# Patient Record
Sex: MALE | Race: WHITE | NOT HISPANIC OR LATINO
[De-identification: names, ages, dates, MRNs, and addresses within clinical notes are randomized per-mention and may not be internally consistent; named-entity substitution may affect disease eponyms.]

---

## 2022-06-20 ENCOUNTER — APPOINTMENT (OUTPATIENT)
Dept: ORTHOPEDIC SURGERY | Facility: CLINIC | Age: 67
End: 2022-06-20
Payer: MEDICARE

## 2022-06-20 PROCEDURE — J3490M: CUSTOM

## 2022-06-20 PROCEDURE — 99213 OFFICE O/P EST LOW 20 MIN: CPT | Mod: 25

## 2022-06-20 PROCEDURE — 20604 DRAIN/INJ JOINT/BURSA W/US: CPT

## 2022-06-20 NOTE — PROCEDURE
[FreeTextEntry3] : Patient has tried OTC's including aspirin, Ibuprofen, Aleve, etc or prescription NSAIDS, and/or exercises at home and/or physical therapy without satisfactory response and the risks benefits, and alternatives have been discussed, and verbal consent was obtained.\par \par The risks, benefits and contents of the injection have been discussed.  Risks include but are not limited to allergic reaction, flare reaction, permanent white skin discoloration at the injection site and infection.  The patient understands the risks and agrees to having the injection.  All questions have been answered.\par \par An injection of the left 4th tarsometatarsal joint was performed. The indication for this procedure was pain and inflammation. The site was prepped with alcohol and sterile technique used. An injection of 1cc of Lidocaine 1% , 1cc of Bupivacaine (Marcaine) 0.5% ,and 1cc of 80mg Methylprednisolone (Depomedrol) was used. Patient tolerated procedure well. Patient was advised to call if redness, pain, or fever occur, apply ice for 15 minutes out of every hour for the next 12-24 hours as tolerated and patient was advised to rest the joint(s) for 3 days.\par \par Ultrasound guidance was indicated for this patient due to joint space narrowing secondary to arthritis . All ultrasound images have been permanently captured and stored accordingly in our picture archiving and communication system. Visualization of the needle and placement of injection was performed without complication.\par

## 2022-06-20 NOTE — PHYSICAL EXAM
[Left] : left foot and ankle [4th] : 4th [Right] : right foot and ankle [NL (40)] : plantar flexion 40 degrees [NL 30)] : inversion 30 degrees [NL (20)] : eversion 20 degrees [5___] : eversion 5[unfilled]/5 [2+] : posterior tibialis pulse: 2+ [Normal] : saphenous nerve sensation normal [] : non-antalgic [de-identified] : eversion 15 degrees [TWNoteComboBox7] : dorsiflexion 15 degrees

## 2022-06-20 NOTE — ASSESSMENT
[FreeTextEntry1] : Patient has no real findings on the right foot.\par We discussed a steroid injection for the left foot, and he would like to proceed.

## 2022-06-20 NOTE — HISTORY OF PRESENT ILLNESS
[de-identified] : Patient returns for follow up of both feet. H/O LT foot OA which responded well to CSI on 9/1/21. He reports RT foot pain feels similar, started without trauma/injury mid February 2022. but resolved.  He states that both feet (left greater than right) started bothering him again in early June, 2022.  No trauma.  Symptoms are worse with start up.

## 2022-08-10 ENCOUNTER — APPOINTMENT (OUTPATIENT)
Dept: NEUROLOGY | Facility: CLINIC | Age: 67
End: 2022-08-10

## 2022-09-20 ENCOUNTER — APPOINTMENT (OUTPATIENT)
Dept: ORTHOPEDIC SURGERY | Facility: CLINIC | Age: 67
End: 2022-09-20

## 2022-09-20 VITALS — BODY MASS INDEX: 34.36 KG/M2 | HEIGHT: 70 IN | WEIGHT: 240 LBS

## 2022-09-20 DIAGNOSIS — E11.9 TYPE 2 DIABETES MELLITUS W/OUT COMPLICATIONS: ICD-10-CM

## 2022-09-20 DIAGNOSIS — E78.00 PURE HYPERCHOLESTEROLEMIA, UNSPECIFIED: ICD-10-CM

## 2022-09-20 DIAGNOSIS — S92.424A NONDISPLACED FRACTURE OF DISTAL PHALANX OF RIGHT GREAT TOE, INITIAL ENCOUNTER FOR CLOSED FRACTURE: ICD-10-CM

## 2022-09-20 PROCEDURE — 73630 X-RAY EXAM OF FOOT: CPT | Mod: RT

## 2022-09-20 PROCEDURE — 28490 TREAT BIG TOE FRACTURE: CPT

## 2022-09-20 PROCEDURE — 99213 OFFICE O/P EST LOW 20 MIN: CPT | Mod: 25

## 2022-09-20 NOTE — REASON FOR VISIT
[FreeTextEntry2] : RT foot  [facility] Pregnancy And Lactation Text: This medication is Pregnancy Category C and it isn't known if it is safe during pregnancy. It is unknown if it is excreted in breast milk.

## 2022-09-20 NOTE — ASSESSMENT
[FreeTextEntry1] : wbat\par supportive shoe\par ice/elevate\par toe spacer\par nsaids prn\par f/up 1 month if not resolved
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
## 2023-03-13 ENCOUNTER — APPOINTMENT (OUTPATIENT)
Dept: ORTHOPEDIC SURGERY | Facility: CLINIC | Age: 68
End: 2023-03-13
Payer: MEDICARE

## 2023-03-13 DIAGNOSIS — S92.424D NONDISPLACED FRACTURE OF DISTAL PHALANX OF RIGHT GREAT TOE, SUBSEQUENT ENCOUNTER FOR FRACTURE WITH ROUTINE HEALING: ICD-10-CM

## 2023-03-13 PROCEDURE — 73630 X-RAY EXAM OF FOOT: CPT | Mod: 50

## 2023-03-13 PROCEDURE — 99213 OFFICE O/P EST LOW 20 MIN: CPT | Mod: 25

## 2023-03-13 PROCEDURE — 73600 X-RAY EXAM OF ANKLE: CPT | Mod: LT

## 2023-03-13 PROCEDURE — 76942 ECHO GUIDE FOR BIOPSY: CPT | Mod: LT

## 2023-03-13 PROCEDURE — 20604 DRAIN/INJ JOINT/BURSA W/US: CPT | Mod: F3

## 2023-03-13 PROCEDURE — J3490M: CUSTOM

## 2023-03-13 NOTE — PHYSICAL EXAM
[Left] : left foot and ankle [4th] : 4th [Right] : right foot and ankle [NL (40)] : plantar flexion 40 degrees [NL 30)] : inversion 30 degrees [NL (20)] : eversion 20 degrees [2+] : posterior tibialis pulse: 2+ [Normal] : saphenous nerve sensation normal [5___] : [facility] 5[unfilled]/5 [] : non-antalgic [Bilateral] : foot bilaterally [Weight -] : weightbearing [de-identified] : Crepitus in the ankle with passive motion. [FreeTextEntry9] : Mild tibiotalar joint degenerative changes with medial malleolar screw.  [de-identified] : B/L mid foot degenerative changes, well healed 1st distal phalanx on the RT [de-identified] : eversion 15 degrees [TWNoteComboBox7] : dorsiflexion 15 degrees

## 2023-03-13 NOTE — HISTORY OF PRESENT ILLNESS
[de-identified] : Patient returns for follow up of both feet. H/O LT foot OA which responded well to4th [facility]  CSI on 6/20/22. He reports RT foot pain feels similar, started without trauma/injury mid February 2022 but resolved.  He had a right great toe fracture on 9/2022, seen by Dr Sprague. He reports both feet are bothering him.

## 2023-06-09 ENCOUNTER — APPOINTMENT (OUTPATIENT)
Dept: ORTHOPEDIC SURGERY | Facility: CLINIC | Age: 68
End: 2023-06-09
Payer: MEDICARE

## 2023-06-09 PROCEDURE — J3490M: CUSTOM

## 2023-06-09 PROCEDURE — 20604 DRAIN/INJ JOINT/BURSA W/US: CPT | Mod: 50

## 2023-06-09 PROCEDURE — 99213 OFFICE O/P EST LOW 20 MIN: CPT | Mod: 25

## 2023-06-09 NOTE — PHYSICAL EXAM
[Left] : left foot and ankle [Right] : right foot and ankle [NL (40)] : plantar flexion 40 degrees [NL 30)] : inversion 30 degrees [NL (20)] : eversion 20 degrees [5___] : eversion 5[unfilled]/5 [2+] : posterior tibialis pulse: 2+ [Normal] : saphenous nerve sensation normal [Bilateral] : foot bilaterally [Weight -] : weightbearing [FreeTextEntry9] : Mild tibiotalar joint degenerative changes with medial malleolar screw.  [de-identified] : B/L mid foot degenerative changes, well healed 1st distal phalanx on the RT [4th] : 4th [] : no pain when stressing lateral tarsal metatarsal joint [de-identified] : Crepitus in the ankle with passive motion. [de-identified] : eversion 15 degrees [TWNoteComboBox7] : dorsiflexion 15 degrees

## 2023-06-09 NOTE — ASSESSMENT
[FreeTextEntry1] : We discussed a steroid injection for each foot and he would like to proceed.\par \par WB in supportive footwear.\par Ice to affected area.\par NSAIDS prn.

## 2023-06-09 NOTE — PROCEDURE
[FreeTextEntry3] : Patient has tried OTC's including aspirin, Ibuprofen, Aleve, etc or prescription NSAIDS, and/or exercises at home and/or physical therapy without satisfactory response and the risks benefits, and alternatives have been discussed, and verbal consent was obtained.\par \par The risks, benefits and contents of the injection have been discussed.  Risks include but are not limited to allergic reaction, flare reaction, permanent white skin discoloration at the injection site and infection.  The patient understands the risks and agrees to having the injection.  All questions have been answered.\par \par An injection of the 4th TMT joint bilaterally was performed. The indication for this procedure was pain and inflammation. The site was prepped with alcohol and sterile technique used. An injection of 1cc of Lidocaine 1% , 1cc of Ropivacaine  0.5% ,and 1cc of 80mg Methylprednisolone (Depomedrol) was used. Patient tolerated procedure well. Patient was advised to call if redness, pain, or fever occur, apply ice for 15 minutes out of every hour for the next 12-24 hours as tolerated and patient was advised to rest the joint(s) for 3 days.\par \par Ultrasound guidance was indicated for this patient due to erosive arthritis. All ultrasound images have been permanently captured and stored accordingly in our picture archiving and communication system. Visualization of the needle and placement of injection was performed without complication.\par

## 2023-06-09 NOTE — HISTORY OF PRESENT ILLNESS
[5] : 5 [8] : 8 [Shooting] : shooting [de-identified] : Patient returns for follow up of both feet. H/O LT foot OA which responded well to 4th TMT steroid injection on 6/20/22. He reports RT foot pain feels similar, started without trauma/injury mid February 2022 but less severe.  He had a right great toe fracture on 9/2022, seen by Dr Sprague. He reports both feet are bothering him. He was active on a vacation recently. Has tried shoe modifications without relief.  [FreeTextEntry1] : b/l feet

## 2023-12-08 ENCOUNTER — APPOINTMENT (OUTPATIENT)
Dept: ORTHOPEDIC SURGERY | Facility: CLINIC | Age: 68
End: 2023-12-08

## 2024-04-26 ENCOUNTER — APPOINTMENT (OUTPATIENT)
Dept: ORTHOPEDIC SURGERY | Facility: CLINIC | Age: 69
End: 2024-04-26
Payer: MEDICARE

## 2024-04-26 DIAGNOSIS — M19.072 PRIMARY OSTEOARTHRITIS, LEFT ANKLE AND FOOT: ICD-10-CM

## 2024-04-26 DIAGNOSIS — M19.071 PRIMARY OSTEOARTHRITIS, RIGHT ANKLE AND FOOT: ICD-10-CM

## 2024-04-26 PROCEDURE — J3490M: CUSTOM

## 2024-04-26 PROCEDURE — 20604 DRAIN/INJ JOINT/BURSA W/US: CPT | Mod: 50

## 2024-04-26 PROCEDURE — 76942 ECHO GUIDE FOR BIOPSY: CPT | Mod: NC

## 2024-04-26 PROCEDURE — 99214 OFFICE O/P EST MOD 30 MIN: CPT | Mod: 25

## 2024-04-26 NOTE — DISCUSSION/SUMMARY
[de-identified] : We discussed a steroid injection for each foot and he would like to proceed.  WB in supportive footwear. Ice to affected area. NSAIDS prn.

## 2024-04-26 NOTE — HISTORY OF PRESENT ILLNESS
[5] : 5 [8] : 8 [Shooting] : shooting [de-identified] : Patient returns for follow up of both feet. He has known history of midfoot OA.  He had bilateral 4th TMT joint injections on 6/9/23 with good relief.  He takes gabapentin and advil as needed. WB in supportive sneakers, using orthotics. He reports both feet are bothering him more over the past three months.  No trauma. [FreeTextEntry1] : b/l feet

## 2024-04-26 NOTE — PHYSICAL EXAM
[Left] : left foot and ankle [Right] : right foot and ankle [4th] : 4th [NL (40)] : plantar flexion 40 degrees [NL 30)] : inversion 30 degrees [5___] : eversion 5[unfilled]/5 [2+] : posterior tibialis pulse: 2+ [Normal] : saphenous nerve sensation normal [NL (20)] : dorsiflexion 20 degrees [] : no pain when stressing lateral tarsal metatarsal joint [de-identified] : Crepitus in the ankle with passive motion. [de-identified] : Venous stasis changes.  [de-identified] : eversion 15 degrees [TWNoteComboBox7] : dorsiflexion 15 degrees

## 2024-04-26 NOTE — PROCEDURE
[FreeTextEntry3] : Patient has tried OTC's including aspirin, Ibuprofen, Aleve, etc or prescription NSAIDS, and/or exercises at home and/or physical therapy without satisfactory response and the risks benefits, and alternatives have been discussed, and verbal consent was obtained.  The risks, benefits and contents of the injection have been discussed.  Risks include but are not limited to allergic reaction, flare reaction, permanent white skin discoloration at the injection site and infection.  The patient understands the risks and agrees to having the injection.  All questions have been answered.  An injection of the right and left 4th tarsometatarsal joints was performed. The indication for this procedure was pain and inflammation. The site was prepped with alcohol and sterile technique used. An injection of 1cc of Lidocaine 1% , 1cc of Ropivacaine  0.5% ,and 1cc of 80mg Methylprednisolone (Depomedrol) was used. Patient tolerated procedure well. Patient was advised to call if redness, pain, or fever occur, apply ice for 15 minutes out of every hour for the next 12-24 hours as tolerated and patient was advised to rest the joint(s) for 3 days.  Ultrasound guidance was indicated for this patient due to erosive arthritis. All ultrasound images have been permanently captured and stored accordingly in our picture archiving and communication system. Visualization of the needle and placement of injection was performed without complication.

## 2024-06-05 NOTE — PHYSICAL EXAM
[Right] : right foot and ankle [Mild] : mild swelling of toe(s) [1st] : 1st [NL (20)] : dorsiflexion 20 degrees [NL (40)] : plantar flexion 40 degrees [5___] : eversion 5[unfilled]/5 [1+] : dorsalis pedis pulse: 1+ [] : patient ambulates without assistive device BMI: BMI (kg/m2): 23.5 (02-10-24 @ 02:59)  HbA1c: A1C with Estimated Average Glucose Result: 6.1 % (01-14-24 @ 04:30)    Glucose: POCT Blood Glucose.: 57 mg/dL (04-15-24 @ 09:20)    BP: 111/78 (06-04-24 @ 08:24) (111/78 - 111/78)Vital Signs Last 24 Hrs  T(C): --  T(F): --  HR: --  BP: --  BP(mean): --  RR: --  SpO2: --      Lipid Panel: Date/Time: 01-14-24 @ 04:30  Cholesterol, Serum: 130  LDL Cholesterol Calculated: 61  HDL Cholesterol, Serum: 53  Total Cholesterol/HDL Ration Measurement: --  Triglycerides, Serum: 79

## 2024-08-15 ENCOUNTER — APPOINTMENT (OUTPATIENT)
Dept: ORTHOPEDIC SURGERY | Facility: CLINIC | Age: 69
End: 2024-08-15
Payer: MEDICARE

## 2024-08-15 VITALS — HEIGHT: 70.5 IN | BODY MASS INDEX: 33.27 KG/M2 | WEIGHT: 235 LBS

## 2024-08-15 DIAGNOSIS — M24.272 DISORDER OF LIGAMENT, LEFT ANKLE: ICD-10-CM

## 2024-08-15 DIAGNOSIS — M19.072 PRIMARY OSTEOARTHRITIS, LEFT ANKLE AND FOOT: ICD-10-CM

## 2024-08-15 DIAGNOSIS — M19.071 PRIMARY OSTEOARTHRITIS, RIGHT ANKLE AND FOOT: ICD-10-CM

## 2024-08-15 PROCEDURE — 99213 OFFICE O/P EST LOW 20 MIN: CPT | Mod: 25

## 2024-08-15 PROCEDURE — 20604 DRAIN/INJ JOINT/BURSA W/US: CPT | Mod: 50

## 2024-08-15 PROCEDURE — J3490M: CUSTOM | Mod: JZ

## 2024-08-15 NOTE — PROCEDURE
[FreeTextEntry3] : Patient has tried OTC's including aspirin, Ibuprofen, Aleve, etc or prescription NSAIDS, and/or exercises at home and/or physical therapy without satisfactory response. A steroid injection to suppress acute inflammation was discussed. Tthe risks benefits, and alternatives have been discussed, and verbal consent was obtained.   The risks, benefits and contents of the injection have been discussed.  Risks include but are not limited to allergic reaction, flare reaction, permanent white skin discoloration at the injection site and infection.  The patient understands the risks and agrees to having the injection.  All questions have been answered.   An injection of the bilateral 4th TMT joints were performed. The indication for this procedure was pain and inflammation. The site was prepped with alcohol and sterile technique used. An injection of 1cc of Lidocaine 1% , 1cc of Ropivacaine  0.5% ,and 1cc of 80mg Methylprednisolone (Depomedrol) was used. Patient tolerated procedure well. Patient was advised to call if redness, pain, or fever occur, apply ice for 15 minutes out of every hour for the next 12-24 hours as tolerated and patient was advised to rest the joint(s) for 3 days.   Ultrasound guidance was indicated for this patient due to erosive arthritis. All ultrasound images have been permanently captured and stored accordingly in our picture archiving and communication system. Visualization of the needle and placement of injection was performed without complication.

## 2024-08-15 NOTE — DISCUSSION/SUMMARY
[de-identified] : We discussed a steroid injection for each foot and he would like to proceed.  WB in supportive footwear. Ice to affected area. NSAIDS prn.   Lace up ankle brace for the left ankle was recommended, but patient declined.

## 2024-08-15 NOTE — PHYSICAL EXAM
[Left] : left foot and ankle [Right] : right foot and ankle [4th] : 4th [NL (40)] : plantar flexion 40 degrees [NL 30)] : inversion 30 degrees [5___] : eversion 5[unfilled]/5 [2+] : posterior tibialis pulse: 2+ [Normal] : saphenous nerve sensation normal [NL (20)] : dorsiflexion 20 degrees [] : non-antalgic [de-identified] : Crepitus in the ankle with passive motion. [de-identified] : Venous stasis changes.  [de-identified] : eversion 15 degrees [TWNoteComboBox7] : N

## 2024-08-15 NOTE — HISTORY OF PRESENT ILLNESS
[Shooting] : shooting [7] : 7 [5] : 5 [Frequent] : frequent [Leisure] : leisure [de-identified] : Patient returns for follow up of both feet. He has known history of midfoot OA.  He had bilateral 4th TMT joint injections on 4/26/24 with good relief.  He takes gabapentin daily and advil as needed. WB in supportive sneakers, using orthotics. He states that the pain in both feet have returned again.  No new trauma. He does report sometimes (rarely) feeling that his left ankle gives out. [FreeTextEntry1] : b/l feet [FreeTextEntry6] : stinging [de-identified] : walking, bike riding

## 2024-11-08 ENCOUNTER — APPOINTMENT (OUTPATIENT)
Dept: ORTHOPEDIC SURGERY | Facility: CLINIC | Age: 69
End: 2024-11-08
Payer: MEDICARE

## 2024-11-08 DIAGNOSIS — M19.071 PRIMARY OSTEOARTHRITIS, RIGHT ANKLE AND FOOT: ICD-10-CM

## 2024-11-08 DIAGNOSIS — M19.072 PRIMARY OSTEOARTHRITIS, LEFT ANKLE AND FOOT: ICD-10-CM

## 2024-11-08 DIAGNOSIS — S96.911A STRAIN OF UNSPECIFIED MUSCLE AND TENDON AT ANKLE AND FOOT LEVEL, RIGHT FOOT, INITIAL ENCOUNTER: ICD-10-CM

## 2024-11-08 PROCEDURE — 99214 OFFICE O/P EST MOD 30 MIN: CPT

## 2024-11-08 PROCEDURE — 73630 X-RAY EXAM OF FOOT: CPT | Mod: RT

## 2025-08-07 ENCOUNTER — APPOINTMENT (OUTPATIENT)
Dept: ORTHOPEDIC SURGERY | Facility: CLINIC | Age: 70
End: 2025-08-07
Payer: MEDICARE

## 2025-08-07 VITALS — WEIGHT: 230 LBS | BODY MASS INDEX: 32.93 KG/M2 | HEIGHT: 70 IN

## 2025-08-07 DIAGNOSIS — M21.41 FLAT FOOT [PES PLANUS] (ACQUIRED), RIGHT FOOT: ICD-10-CM

## 2025-08-07 DIAGNOSIS — M19.071 PRIMARY OSTEOARTHRITIS, RIGHT ANKLE AND FOOT: ICD-10-CM

## 2025-08-07 DIAGNOSIS — M79.89 OTHER SPECIFIED SOFT TISSUE DISORDERS: ICD-10-CM

## 2025-08-07 DIAGNOSIS — Z86.39 PERSONAL HISTORY OF OTHER ENDOCRINE, NUTRITIONAL AND METABOLIC DISEASE: ICD-10-CM

## 2025-08-07 DIAGNOSIS — M21.42 FLAT FOOT [PES PLANUS] (ACQUIRED), RIGHT FOOT: ICD-10-CM

## 2025-08-07 PROCEDURE — 99204 OFFICE O/P NEW MOD 45 MIN: CPT

## 2025-08-07 PROCEDURE — 73630 X-RAY EXAM OF FOOT: CPT | Mod: 50

## 2025-08-07 RX ORDER — DICLOFENAC SODIUM 10 MG/G
1 GEL TOPICAL DAILY
Qty: 1 | Refills: 0 | Status: ACTIVE | COMMUNITY
Start: 2025-08-07 | End: 1900-01-01

## 2025-08-08 ENCOUNTER — APPOINTMENT (OUTPATIENT)
Dept: ORTHOPEDIC SURGERY | Facility: CLINIC | Age: 70
End: 2025-08-08
Payer: MEDICARE

## 2025-08-08 DIAGNOSIS — M19.071 PRIMARY OSTEOARTHRITIS, RIGHT ANKLE AND FOOT: ICD-10-CM

## 2025-08-08 PROCEDURE — 99213 OFFICE O/P EST LOW 20 MIN: CPT

## 2025-08-08 RX ORDER — CELECOXIB 200 MG/1
200 CAPSULE ORAL
Qty: 30 | Refills: 2 | Status: ACTIVE | COMMUNITY
Start: 2025-08-08 | End: 1900-01-01

## 2025-08-14 ENCOUNTER — NON-APPOINTMENT (OUTPATIENT)
Age: 70
End: 2025-08-14

## 2025-08-25 ENCOUNTER — NON-APPOINTMENT (OUTPATIENT)
Age: 70
End: 2025-08-25

## 2025-09-15 ENCOUNTER — APPOINTMENT (OUTPATIENT)
Dept: ORTHOPEDIC SURGERY | Facility: CLINIC | Age: 70
End: 2025-09-15

## 2025-09-15 DIAGNOSIS — M19.071 PRIMARY OSTEOARTHRITIS, RIGHT ANKLE AND FOOT: ICD-10-CM
